# Patient Record
Sex: MALE | Race: OTHER | NOT HISPANIC OR LATINO
[De-identification: names, ages, dates, MRNs, and addresses within clinical notes are randomized per-mention and may not be internally consistent; named-entity substitution may affect disease eponyms.]

---

## 2017-08-30 ENCOUNTER — TRANSCRIPTION ENCOUNTER (OUTPATIENT)
Age: 47
End: 2017-08-30

## 2018-07-21 ENCOUNTER — EMERGENCY (EMERGENCY)
Facility: HOSPITAL | Age: 48
LOS: 1 days | Discharge: HOME | End: 2018-07-21
Attending: EMERGENCY MEDICINE
Payer: SELF-PAY

## 2018-07-21 VITALS
TEMPERATURE: 98 F | RESPIRATION RATE: 18 BRPM | HEART RATE: 74 BPM | SYSTOLIC BLOOD PRESSURE: 107 MMHG | DIASTOLIC BLOOD PRESSURE: 66 MMHG | OXYGEN SATURATION: 100 %

## 2018-07-21 VITALS
DIASTOLIC BLOOD PRESSURE: 76 MMHG | HEIGHT: 74 IN | WEIGHT: 201.94 LBS | HEART RATE: 92 BPM | RESPIRATION RATE: 18 BRPM | SYSTOLIC BLOOD PRESSURE: 116 MMHG | TEMPERATURE: 98 F | OXYGEN SATURATION: 98 %

## 2018-07-21 PROCEDURE — 82962 GLUCOSE BLOOD TEST: CPT

## 2018-07-21 PROCEDURE — 99283 EMERGENCY DEPT VISIT LOW MDM: CPT

## 2018-07-21 PROCEDURE — 99285 EMERGENCY DEPT VISIT HI MDM: CPT

## 2018-07-21 NOTE — ED PROVIDER NOTE - OBJECTIVE STATEMENT
seen on arrival, comes via ems, found intoxicated in public, cant walk  pt reports drinking a lot, is visually imparied and had a hard time getting home  he called his boyfriend to pick him up  No sick contacts, no injury or trauma, no travel.

## 2018-07-21 NOTE — ED PROVIDER NOTE - MEDICAL DECISION MAKING DETAILS
nos igns fo trauma, reports drinking, clinically intox  dispo when sober or if partner comes to pick patient up

## 2018-07-21 NOTE — ED ADULT NURSE NOTE - ED STAT RN HANDOFF DETAILS
Received patient from Manolo SIEGEL in no acute distress, with unsteady gait ambulated to bathroom by Rn. Patient resting not admitted continue to monitor patient.

## 2018-07-21 NOTE — ED ADULT NURSE NOTE - ED STAT RN HANDOFF DETAILS 2
Patient discharged home as per MD order spouse here to pick patient. Patient OOB ambulatory in no acute distress.

## 2023-01-18 ENCOUNTER — APPOINTMENT (OUTPATIENT)
Dept: ORTHOPEDIC SURGERY | Facility: CLINIC | Age: 53
End: 2023-01-18
Payer: COMMERCIAL

## 2023-01-18 DIAGNOSIS — Z00.00 ENCOUNTER FOR GENERAL ADULT MEDICAL EXAMINATION W/OUT ABNORMAL FINDINGS: ICD-10-CM

## 2023-01-18 PROCEDURE — 73564 X-RAY EXAM KNEE 4 OR MORE: CPT | Mod: RT

## 2023-01-18 PROCEDURE — 73030 X-RAY EXAM OF SHOULDER: CPT | Mod: RT

## 2023-01-18 PROCEDURE — 99204 OFFICE O/P NEW MOD 45 MIN: CPT

## 2023-01-18 RX ORDER — MELOXICAM 15 MG/1
15 TABLET ORAL
Qty: 30 | Refills: 0 | Status: ACTIVE | COMMUNITY
Start: 2023-01-18 | End: 1900-01-01

## 2023-01-18 NOTE — DISCUSSION/SUMMARY
[de-identified] : Assessment: The patient is a 52 year old male with R shoulder and R knee pain and physical exam findings consistent with OA R knee and R shoulder.\par \par Patient and I discussed their symptoms. Discussed findings of today's exam and possible causes of patient's pain. Educated patient on their most probable diagnosis. Reviewed possible courses of treatment, and we collaboratively decided best course of treatment at this time will include:\par \par 1. MRI R knee\par 2. Start Meloxicam \par 3. Will consider CSI if no improvement\par \par The patient's current medication management of their orthopedic diagnosis was reviewed today: \par \par (1) We discussed a comprehensive treatment plan that included possible pharmaceutical management involving the use of prescription strength medications including but not limited to options such as oral Naprosyn 500mg BID, once daily Meloxicam 15 mg, or 500-650 mg Tylenol versus over the counter oral medications and topical prescription NSAID Pennsaid vs over the counter Voltaren gel. \par \par (2) There is a moderate risk of morbidity with further treatment, especially from use of prescription strength medications and possible side effects of these medications which include upset stomach with oral medications, skin reactions to topical medications and cardiac/renal issues with long term use. \par \par (3) I recommended that the patient follow-up with their medical physician to discuss any significant specific potential issues with long term medication use such as interactions with current medications or with exacerbation of underlying medical comorbidities. \par \par (4) The benefits and risks associated with use of injectable, oral or topical, prescription and over the counter anti-inflammatory medications were discussed with the patient. The patient voiced understanding of the risks including but not limited to bleeding, stroke, kidney dysfunction, heart disease, and were referred to the black box warning label for further information.\par \par Prior to appointment and during encounter with patient extensive medical records were reviewed including but not limited to, hospital records, out patient records, imaging results, and lab data. During this appointment the patient was examined, diagnoses were discussed and explained in a face to face manner. In addition extensive time was spent reviewing aforementioned diagnostic studies. Counseling including abnormal image results, differential diagnoses, treatment options, risk and benefits, lifestyle changes, current condition, and current medications was performed. Patient's comments, questions, and concerns were address and patient verbalized understanding.\par \par Follow up after MRI. \par \par History, physical exam, imaging, assessment and plan documented by Ari Miles. The documentation recorded by the scribe accurately reflects the service I, Zia Morales MD, personally performed and the decisions made by me.

## 2023-01-18 NOTE — HISTORY OF PRESENT ILLNESS
[8] : 8 [Dull/Aching] : dull/aching [Radiating] : radiating [Sharp] : sharp [Shooting] : shooting [Throbbing] : throbbing [Constant] : constant [Meds] : meds [Sitting] : sitting [Standing] : standing [Bending forward] : bending forward [Extending back] : extending back [Stairs] : stairs [de-identified] : 01/18/2023: 52 year male is here today as a new patient for chronic R knee and R shoulder pain\par Pt denies acute trauma, no n/t, no n/t radiating to hand on affected side. \par Experiences mechanical sxs in his R knee daily\par Denies clicking, catching, locking of his shoulder \par Pt not taking NSAIDs, no injections, not doing PT. \par No h/o surgeries\par h/o meniscus tear L knee years ago, pain feels similar to current R knee pain [] : no [FreeTextEntry1] : Right Shoulder/Right Knee [FreeTextEntry5] : JESSIE is here for Right Shoulder/Right Knee. 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 Pt states is not able to lift nor extend with the Shoulder. Pt states when putting pressure and as well when lying down.

## 2023-01-18 NOTE — IMAGING
[Type 2 acromion] : Type 2 acromion [Right] : right knee [There are no fractures, subluxations or dislocations. No significant abnormalities are seen] : There are no fractures, subluxations or dislocations. No significant abnormalities are seen [Degenerative change] : Degenerative change [Mild tricompartmental OA medial narrowing] : Mild tricompartmental OA medial narrowing [de-identified] : The patient is a well appearing 52 year year old male of their stated age.\par Patient ambulates with a normal gait.\par Negative straight leg raise bilateral\par \par Effected Knee:                         	\par ROM:  0-145 degrees\par \par Lachman: Negative\par Pivot Shift: Negative\par Anterior Drawer: Negative\par Posterior Drawer / Sag: Negative\par Varus Stress 0 degrees: Stable\par Varus Stress 30 degrees: Stable\par Valgus Stress 0 degrees: Stable\par Valgus Stress 30 degrees: Stable\par Medial Marlo: Positive\par Lateral Marlo: Negative\par Patella Glide: 2+\par Patella Apprehension: Negative\par Patella Grind: Negative\par \par Palpation:\par Medial Joint Line: TTP\par Lateral Joint Line: Nontender\par Medial Collateral Ligament: Nontender\par Lateral Collateral Ligament/PLC: Nontender\par Distal Femur: Nontender\par Proximal Tibia: Nontender\par Tibial Tubercle: Nontender\par Distal Pole Patella: Nontender\par Quadriceps Tendon: Nontender &  Intact\par Patella Tendon: Nontender &  Intact\par Medial Distal Hamstring/PES: Nontender\par Lateral Distal Hamstring: Nontender & Stable\par Iliotibial Band: Nontender\par Medial Patellofemoral Ligament: Nontender\par Adductor: Nontender\par Proximal GSC-Plantaris: Nontender\par Calf: Supple & Nontender\par \par Inspection:\par Deformity: No\par Erythema: No\par Ecchymosis: No\par Abrasions: No\par Effusion: Moderate\par Prepatellar Bursitis: No\par \par Neurologic Exam:\par Sensation L4-S1: Grossly Intact\par \par Motor Exam:\par Quadriceps: 5 out of 5\par Hamstrings: 5 out of 5\par EHL: 5 out of 5\par FHL: 5 out of 5\par TA: 5 out of 5\par GS: 5 out of 5\par \par Circulatory/Pulses:\par Dorsalis Pedis: 2+\par Posterior Tibialis: 2+\par \par Additional Pertinent Findings: None\par \par Contralateral Knee:                           	\par ROM: 0-145 degrees\par \par Other Pertinent Findings: None\par   \par ----------------\par Neck is supple & nontender to palpation. Negative Spurling's test.\par \par Effected Shoulder \par Inspection:\par Scapula Winging: Negative\par Deformity: None\par Erythema: None\par Ecchymosis: None\par Abrasions: None\par Effusion: Mild\par Crepitus: Moderate\par \par Range of Motion:\par Active Forward Flexion:110 degrees \par Passive Forward Flexion:130 degrees \par Active IR : back pocket\par Active ER :30 degrees\par \par Motor Exam:\par Forward Flexion: 4+ out of 5\par Flexion Plane of Scapula: 5 out of 5\par Abduction: 4+ out of 5\par Internal Rotation: 5 out of 5\par External Rotation: 4+ out of 5\par Distal Motor Strength: 5 out of 5\par \par Stability Testing:\par Anterior: 1+\par Posterior: 1+\par Sulcus N: 1+\par Sulcus ER: 1+\par \par Provocative Tests:\par Drop Arm: Negative\par Reeder/Impingement: Positive\par [place]: Positive\par X-Arm Adduction: Negative\par Belly Press: Negative\par Bear Hug: Negative\par Lift Off: Negative\par Apprehension: Negative\par Relocation: Negative\par Posterior Load & Shift: Negative\par \par Palpation:\par AC Joint: Nontender\par Clavicle: Nontender\par SC Joint: Nontender\par Bicepital Groove: Positive\par Coracoid Process: Positive\par Pectoralis Minor Tendon: Nontender\par Pectoralis Major Tendon: Nontender & palpably intact\par Latissimus Dorsi: Nontender \par Proximal Humerus: Positive\par Scapula Body: Nontender\par Medial Scapula Boarder: Nontender\par Scapula Spine: Nontender\par \par Neurologic Exam: Sensation to Light Touch:\par Axillary: Grossly intact\par Ulnar: Grossly intact\par Radial: Grossly intact\par Median: Grossly intact\par Other:  N/A\par \par Circulatory/Pulses:\par Ulnar: 2+\par Radial: 2+\par Other Pertinent Findings: None\par \par Contralateral Shoulder\par Range of Motion:\par Active Forward Flexion: 180 degrees \par Active Abduction: 180 degrees \par Passive Forward Flexion: 180 degrees \par Passive Abduction: 180 degrees \par ER @ 90 degrees: 90 degrees\par IR @ 90 degrees: 45 degrees\par ER @ 0 degrees: 50 degrees\par \par Motor Exam:\par Forward Flexion: 5 out of 5\par Flexion Plane of Scapula: 5 out of 5\par Abduction: 5 out of 5\par Internal Rotation: 5 out of 5\par External Rotation: 5 out of 5\par Distal Motor Strength: 5 out of 5\par \par Stability Testing:\par Anterior: 1+\par Posterior: 1+\par Sulcus N: 1+\par Sulcus ER: 1+\par \par Other Pertinent Findings: None\par

## 2023-01-26 ENCOUNTER — FORM ENCOUNTER (OUTPATIENT)
Age: 53
End: 2023-01-26

## 2023-01-27 ENCOUNTER — APPOINTMENT (OUTPATIENT)
Dept: MRI IMAGING | Facility: CLINIC | Age: 53
End: 2023-01-27
Payer: COMMERCIAL

## 2023-01-27 PROCEDURE — 73721 MRI JNT OF LWR EXTRE W/O DYE: CPT | Mod: RT

## 2023-02-08 ENCOUNTER — APPOINTMENT (OUTPATIENT)
Dept: ORTHOPEDIC SURGERY | Facility: CLINIC | Age: 53
End: 2023-02-08
Payer: COMMERCIAL

## 2023-02-08 DIAGNOSIS — M19.011 PRIMARY OSTEOARTHRITIS, RIGHT SHOULDER: ICD-10-CM

## 2023-02-08 PROCEDURE — 99214 OFFICE O/P EST MOD 30 MIN: CPT

## 2023-02-14 NOTE — HISTORY OF PRESENT ILLNESS
[8] : 8 [Dull/Aching] : dull/aching [Intermittent] : intermittent [de-identified] : 2/8/23 - Patient presents today to discuss MRI results of the R knee.  [] : no [FreeTextEntry5] : JESSIE is here for Right Knee.Pt states having some slight pain specially when walking and when doing slight movement is when the pain increases.

## 2023-02-14 NOTE — DATA REVIEWED
[MRI] : MRI [Right] : of the right [Report was reviewed and noted in the chart] : The report was reviewed and noted in the chart [I independently reviewed and interpreted images and report] : I independently reviewed and interpreted images and report [FreeTextEntry1] : 1. Medial meniscal tear.\par 2. Arthrosis with joint effusion.\par 3. Lateral subluxation of the patella. Quadriceps insertional tendinopathy and fraying. Proximal patella \par tendinopathy.\par 4. Impaction injury of the fibular head with 5 mm cyst and reactive marrow edema.\par 5. Incompletely evaluated enchondroma of fibular head with no fracture along the visualized margins of the \par enchondroma.\par 6. 5 mm loose body versus focal fat mimicking loose body posterior to the tibiofibular joint.

## 2023-02-14 NOTE — DISCUSSION/SUMMARY
[de-identified] : Discussed his MMT and OA. Advised he see Dr. Galeana for consultation for possible arthroplasty. If not indicated, can consider arthroscopy in form of PMM.\par \par The natural progression of osteoarthritis was explained to the patient.  We discussed the possible treatment options from conservative to operative.  These included NSAIDS, Glucosamine and Chondrotin sulfate, and Physical Therapy as well different types of injections.  We also discussed that at some point they may progress to needed a TKA.  Information and pamphlets were given.\par \par We discussed their diagnosis and treatment options at length including surgical and non-surgical options. We will first attempt conservative treatment with activity modification, PT, icing, weight loss, and anti-inflammatory medications. We discussed the possible of injections (steroid and viscosupplementation) in the future. The patient was provided with a PT prescription to work on ROM, hip ER/abductors strengthening, quad/hamstring stretches and strengthening, and other exercises on the Knee Arthritis Protocol.\par \par Dx / Natural History:\par The patient was advised of the diagnosis.  The natural history of the pathology was explained in full to the patient in layman's terms.  Several different treatment options were discussed and explained in full to the patient including the risks and benefits of both surgical and non-surgical treatments.  All questions and concerns were answered. \par \par Pain Guide Activities:\par The patient was advised to let pain guide the gradual advancement of activities.\par \par Physical Therapy:\par The patient was provided with a prescription for Physical Therapy.\par \par Icing:\par The patient was advised to apply ice (wrapped in a towel or protective covering) to the area daily (20 minutes at a time, 2-4X/day).

## 2023-02-14 NOTE — IMAGING
[de-identified] : The patient is a well appearing 52 year year old male of their stated age.\par Patient ambulates with a normal gait.\par Negative straight leg raise bilateral\par \par Effected Knee:                         	\par ROM:  0-145 degrees\par \par Lachman: Negative\par Pivot Shift: Negative\par Anterior Drawer: Negative\par Posterior Drawer / Sag: Negative\par Varus Stress 0 degrees: Stable\par Varus Stress 30 degrees: Stable\par Valgus Stress 0 degrees: Stable\par Valgus Stress 30 degrees: Stable\par Medial Marlo: Positive\par Lateral Marlo: Negative\par Patella Glide: 2+\par Patella Apprehension: Negative\par Patella Grind: Negative\par \par Palpation:\par Medial Joint Line: TTP\par Lateral Joint Line: Nontender\par Medial Collateral Ligament: Nontender\par Lateral Collateral Ligament/PLC: Nontender\par Distal Femur: Nontender\par Proximal Tibia: Nontender\par Tibial Tubercle: Nontender\par Distal Pole Patella: Nontender\par Quadriceps Tendon: Nontender &  Intact\par Patella Tendon: Nontender &  Intact\par Medial Distal Hamstring/PES: Nontender\par Lateral Distal Hamstring: Nontender & Stable\par Iliotibial Band: Nontender\par Medial Patellofemoral Ligament: Nontender\par Adductor: Nontender\par Proximal GSC-Plantaris: Nontender\par Calf: Supple & Nontender\par \par Inspection:\par Deformity: No\par Erythema: No\par Ecchymosis: No\par Abrasions: No\par Effusion: Moderate\par Prepatellar Bursitis: No\par \par Neurologic Exam:\par Sensation L4-S1: Grossly Intact\par \par Motor Exam:\par Quadriceps: 5 out of 5\par Hamstrings: 5 out of 5\par EHL: 5 out of 5\par FHL: 5 out of 5\par TA: 5 out of 5\par GS: 5 out of 5\par \par Circulatory/Pulses:\par Dorsalis Pedis: 2+\par Posterior Tibialis: 2+\par \par Additional Pertinent Findings: None\par \par Contralateral Knee:                           	\par ROM: 0-145 degrees\par \par Other Pertinent Findings: None\par   \par ----------------\par Neck is supple & nontender to palpation. Negative Spurling's test.\par \par Effected Shoulder \par Inspection:\par Scapula Winging: Negative\par Deformity: None\par Erythema: None\par Ecchymosis: None\par Abrasions: None\par Effusion: Mild\par Crepitus: Moderate\par \par Range of Motion:\par Active Forward Flexion:110 degrees \par Passive Forward Flexion:130 degrees \par Active IR : back pocket\par Active ER :30 degrees\par \par Motor Exam:\par Forward Flexion: 4+ out of 5\par Flexion Plane of Scapula: 5 out of 5\par Abduction: 4+ out of 5\par Internal Rotation: 5 out of 5\par External Rotation: 4+ out of 5\par Distal Motor Strength: 5 out of 5\par \par Stability Testing:\par Anterior: 1+\par Posterior: 1+\par Sulcus N: 1+\par Sulcus ER: 1+\par \par Provocative Tests:\par Drop Arm: Negative\par Reeder/Impingement: Positive\par [place]: Positive\par X-Arm Adduction: Negative\par Belly Press: Negative\par Bear Hug: Negative\par Lift Off: Negative\par Apprehension: Negative\par Relocation: Negative\par Posterior Load & Shift: Negative\par \par Palpation:\par AC Joint: Nontender\par Clavicle: Nontender\par SC Joint: Nontender\par Bicepital Groove: Positive\par Coracoid Process: Positive\par Pectoralis Minor Tendon: Nontender\par Pectoralis Major Tendon: Nontender & palpably intact\par Latissimus Dorsi: Nontender \par Proximal Humerus: Positive\par Scapula Body: Nontender\par Medial Scapula Boarder: Nontender\par Scapula Spine: Nontender\par \par Neurologic Exam: Sensation to Light Touch:\par Axillary: Grossly intact\par Ulnar: Grossly intact\par Radial: Grossly intact\par Median: Grossly intact\par Other:  N/A\par \par Circulatory/Pulses:\par Ulnar: 2+\par Radial: 2+\par Other Pertinent Findings: None\par \par Contralateral Shoulder\par Range of Motion:\par Active Forward Flexion: 180 degrees \par Active Abduction: 180 degrees \par Passive Forward Flexion: 180 degrees \par Passive Abduction: 180 degrees \par ER @ 90 degrees: 90 degrees\par IR @ 90 degrees: 45 degrees\par ER @ 0 degrees: 50 degrees\par \par Motor Exam:\par Forward Flexion: 5 out of 5\par Flexion Plane of Scapula: 5 out of 5\par Abduction: 5 out of 5\par Internal Rotation: 5 out of 5\par External Rotation: 5 out of 5\par Distal Motor Strength: 5 out of 5\par \par Stability Testing:\par Anterior: 1+\par Posterior: 1+\par Sulcus N: 1+\par Sulcus ER: 1+\par \par Other Pertinent Findings: None\par

## 2023-03-08 ENCOUNTER — APPOINTMENT (OUTPATIENT)
Dept: ORTHOPEDIC SURGERY | Facility: CLINIC | Age: 53
End: 2023-03-08
Payer: COMMERCIAL

## 2023-03-08 VITALS — HEIGHT: 72 IN | WEIGHT: 210 LBS | BODY MASS INDEX: 28.44 KG/M2

## 2023-03-08 PROCEDURE — 99214 OFFICE O/P EST MOD 30 MIN: CPT

## 2023-03-08 NOTE — ASSESSMENT
[FreeTextEntry1] : 52 year M with right knee mensical tear and OA\par - physical therapy, NSAIDs, and meloxicam\par - Return in 1-2 weeks for CSI of the right knee

## 2023-03-08 NOTE — IMAGING
[de-identified] : Knee Exam\par Alignment: Varus  Neutral  Valgus\par Effusion: None\par Atrophy: None                                                \par Stable to Varus/valgus stress\par Posterior Drawer Test: negative\par Anterior Drawer Test: Negative\par Knee Extension/Flexion: 0 / 120\par \par Medial/lateral compartments\par Medial joint line: POS Tenderness\par Lateral joint line: No Tenderness\par Marlo test: POS\par \par Patellofemoral joint\par Medial patellar facet: no tenderness\par Patellar grind: Negative\par \par Tendons:\par Pes Anserine: No tenderness\par Gerdy’s Tubercle/ IT Band: No tenderness\par Quadriceps Tendon: No Tenderness\par patellar tendon: no Tenderness\par Tibial tubercle: not tenderness\par Calf: no Tenderness\par \par Neurovascular exam\par Muscle strength: 5/5\par Sensation to light touch: intact\par Distal pulses: 2+\par \par MRI \par 1. Medial meniscal tear.\par 2. Arthrosis with joint effusion.\par 3. Lateral subluxation of the patella. Quadriceps insertional tendinopathy and fraying. Proximal patella \par tendinopathy.\par 4. Impaction injury of the fibular head with 5 mm cyst and reactive marrow edema.\par 5. Incompletely evaluated enchondroma of fibular head with no fracture along the visualized margins of the \par enchondroma.\par 6. 5 mm loose body versus focal fat mimicking loose body posterior to the tibiofibular joint. Plain film \par correlation is suggested.

## 2023-03-08 NOTE — HISTORY OF PRESENT ILLNESS
[de-identified] : 03/08/2023 Mr. JESSIE BINGHAM is a 52 years old  M who   presents today for evaluation of 01/18/2023: 52 year male is here today as a new patient for chronic R knee\par Pt denies acute trauma, no n/t, no n/t radiating to hand on affected side. \par Experiences mechanical sxs in his R knee daily\par Pt not taking NSAIDs, no injections, not doing PT. \par No h/o surgeries\par h/o meniscus tear L knee years ago, pain feels similar to current R knee pain \par \par + meloxicam.\par

## 2023-03-15 ENCOUNTER — APPOINTMENT (OUTPATIENT)
Dept: ORTHOPEDIC SURGERY | Facility: CLINIC | Age: 53
End: 2023-03-15
Payer: COMMERCIAL

## 2023-03-15 PROCEDURE — 20610 DRAIN/INJ JOINT/BURSA W/O US: CPT | Mod: RT

## 2023-03-15 PROCEDURE — 99213 OFFICE O/P EST LOW 20 MIN: CPT | Mod: 25

## 2023-03-15 NOTE — PROCEDURE
[FreeTextEntry3] : The risks, benefits and alternatives to the injection were discussed with the patient. The decision was made to proceed with the injection to reduce inflammation within the area. Verbal consent was obtained for the procedure. The right knee was cleaned with alcohol and ethyl chloride was sprayed topically. 1cc of 40mg Kenalog and 4cc of 1% lidocaine was injected. The patient tolerated the procedure well and was instructed to ice the affected joint as needed later today. Activities can be performed as tolerated\par

## 2023-03-15 NOTE — HISTORY OF PRESENT ILLNESS
[de-identified] : 03/08/2023 Mr. JESSIE BINGHAM is a 52 years old  M who   presents today for evaluation of 01/18/2023: 52 year male is here today as a new patient for chronic R knee\par Pt denies acute trauma, no n/t, no n/t radiating to hand on affected side. \par Experiences mechanical sxs in his R knee daily\par Pt not taking NSAIDs, no injections, not doing PT. \par No h/o surgeries\par h/o meniscus tear L knee years ago, pain feels similar to current R knee pain \par \par + meloxicam.\par \par 03/15/2023 JESSIE 52 year old M is here for Right Knee, states is here to get CSI, is doing the physical therapy for the Right Knee and it is helping, states sometimes walking is having sharp pain and when lying down is having some aching feeling

## 2023-03-15 NOTE — ASSESSMENT
[FreeTextEntry1] : 52 year M with right knee mensical tear and OA\par - physical therapy, NSAIDs, and meloxicam\par - Return in 1-2 weeks for CSI of the right knee\par \par 03/15/2023 CSI of the right knee today\par 3 months PRN fu

## 2023-03-15 NOTE — IMAGING
[de-identified] : Knee Exam\par Alignment: Varus  Neutral  Valgus\par Effusion: None\par Atrophy: None                                                \par Stable to Varus/valgus stress\par Posterior Drawer Test: negative\par Anterior Drawer Test: Negative\par Knee Extension/Flexion: 0 / 120\par \par Medial/lateral compartments\par Medial joint line: POS Tenderness\par Lateral joint line: No Tenderness\par Marlo test: POS\par \par Patellofemoral joint\par Medial patellar facet: no tenderness\par Patellar grind: Negative\par \par Tendons:\par Pes Anserine: No tenderness\par Gerdy’s Tubercle/ IT Band: No tenderness\par Quadriceps Tendon: No Tenderness\par patellar tendon: no Tenderness\par Tibial tubercle: not tenderness\par Calf: no Tenderness\par \par Neurovascular exam\par Muscle strength: 5/5\par Sensation to light touch: intact\par Distal pulses: 2+\par \par MRI \par 1. Medial meniscal tear.\par 2. Arthrosis with joint effusion.\par 3. Lateral subluxation of the patella. Quadriceps insertional tendinopathy and fraying. Proximal patella \par tendinopathy.\par 4. Impaction injury of the fibular head with 5 mm cyst and reactive marrow edema.\par 5. Incompletely evaluated enchondroma of fibular head with no fracture along the visualized margins of the \par enchondroma.\par 6. 5 mm loose body versus focal fat mimicking loose body posterior to the tibiofibular joint. Plain film \par correlation is suggested.

## 2023-06-01 ENCOUNTER — APPOINTMENT (OUTPATIENT)
Dept: ORTHOPEDIC SURGERY | Facility: CLINIC | Age: 53
End: 2023-06-01
Payer: COMMERCIAL

## 2023-06-01 PROCEDURE — 72110 X-RAY EXAM L-2 SPINE 4/>VWS: CPT

## 2023-06-01 PROCEDURE — 99214 OFFICE O/P EST MOD 30 MIN: CPT

## 2023-06-01 PROCEDURE — 72170 X-RAY EXAM OF PELVIS: CPT

## 2023-06-01 NOTE — IMAGING
[de-identified] : LSPINE\par Inspection: No rash or ecchymosis\par Palpation: No tenderness to palpation or spasm in bilateral thoracic and lumbar paraspinal musculature, R>l SI joint tenderness to palpation\par ROM: Full with stiffness\par Strength: 5/5 bilateral hip flexors, knee extensors, ankle dorsiflexors, EHL, ankle plantarflexors\par Sensation: Sensation present to light touch bilateral L2-S1 distributions\par Provocative maneuvers: + bilateral straight leg raise \par \par Bilateral hips-\par Palpation: No tenderness to palpation over LEFT greater trochanter or IT band; TTP R GT\par ROM: No pain with flexion and internal rotation  [Facet arthropathy] : Facet arthropathy [AP] : anteroposterior [Disc space narrowing] : Disc space narrowing [There are no fractures, subluxations or dislocations. No significant abnormalities are seen] : There are no fractures, subluxations or dislocations. No significant abnormalities are seen

## 2023-06-01 NOTE — HISTORY OF PRESENT ILLNESS
[5] : 5 [8] : 8 [Burning] : burning [Sharp] : sharp [Stabbing] : stabbing [Intermittent] : intermittent [Nothing helps with pain getting better] : Nothing helps with pain getting better [Lying in bed] : lying in bed [de-identified] : 6/1/23-\par JESSIE 52 year old M here for Lower back and RT hip, onset pain since 4/2023, denies and injury , pt was driving from [place] and felt a burning sensation radiating down the RLE posterior thigh. Intermittent pain down the BLE to the legs posteriorly and to the feet.  [] : no [de-identified] : certain movement

## 2023-06-01 NOTE — ASSESSMENT
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  Advised to clear medication with internist or PCP if any concurrent health problem with heart, blood pressure, or GI system exists.\par \par Gabapentin- Patient advised of sedating effects, instructed not to drive, operate machinery, or take with other sedating medications. Advised of need to taper on/off medication and risk of abruptly stopping gabapentin.

## 2023-06-21 ENCOUNTER — APPOINTMENT (OUTPATIENT)
Dept: ORTHOPEDIC SURGERY | Facility: CLINIC | Age: 53
End: 2023-06-21

## 2023-06-21 ENCOUNTER — APPOINTMENT (OUTPATIENT)
Dept: ORTHOPEDIC SURGERY | Facility: CLINIC | Age: 53
End: 2023-06-21
Payer: COMMERCIAL

## 2023-06-21 PROCEDURE — 99214 OFFICE O/P EST MOD 30 MIN: CPT | Mod: 25

## 2023-06-21 PROCEDURE — 20610 DRAIN/INJ JOINT/BURSA W/O US: CPT | Mod: RT

## 2023-06-21 NOTE — ASSESSMENT
[FreeTextEntry1] : 52 year M with right knee mensical tear and OA\par - physical therapy, NSAIDs, and meloxicam\par - Return in 1-2 weeks for CSI of the right knee\par \par 03/15/2023 CSI of the right knee today\par 3 months PRN fu\par \par 6/21/23: CSI of the right knee. PT renewed for Lspine and knee\par Follow up 3 months PRN

## 2023-06-21 NOTE — IMAGING
[de-identified] : Knee Exam\par Alignment: Varus  Neutral  Valgus\par Effusion: None\par Atrophy: None                                                \par Stable to Varus/valgus stress\par Posterior Drawer Test: negative\par Anterior Drawer Test: Negative\par Knee Extension/Flexion: 0 / 120\par \par Medial/lateral compartments\par Medial joint line: POS Tenderness\par Lateral joint line: No Tenderness\par Marlo test: POS\par \par Patellofemoral joint\par Medial patellar facet: no tenderness\par Patellar grind: Negative\par \par Tendons:\par Pes Anserine: No tenderness\par Gerdy’s Tubercle/ IT Band: No tenderness\par Quadriceps Tendon: No Tenderness\par patellar tendon: no Tenderness\par Tibial tubercle: not tenderness\par Calf: no Tenderness\par \par Neurovascular exam\par Muscle strength: 5/5\par Sensation to light touch: intact\par Distal pulses: 2+\par \par MRI \par 1. Medial meniscal tear.\par 2. Arthrosis with joint effusion.\par 3. Lateral subluxation of the patella. Quadriceps insertional tendinopathy and fraying. Proximal patella \par tendinopathy.\par 4. Impaction injury of the fibular head with 5 mm cyst and reactive marrow edema.\par 5. Incompletely evaluated enchondroma of fibular head with no fracture along the visualized margins of the \par enchondroma.\par 6. 5 mm loose body versus focal fat mimicking loose body posterior to the tibiofibular joint. Plain film \par correlation is suggested.

## 2023-06-21 NOTE — HISTORY OF PRESENT ILLNESS
[de-identified] : 03/08/2023 Mr. JESSIE BINGHAM is a 52 years old  M who   presents today for evaluation of 01/18/2023: 52 year male is here today as a new patient for chronic R knee\par Pt denies acute trauma, no n/t, no n/t radiating to hand on affected side. \par Experiences mechanical sxs in his R knee daily\par Pt not taking NSAIDs, no injections, not doing PT. \par No h/o surgeries\par h/o meniscus tear L knee years ago, pain feels similar to current R knee pain \par \par + meloxicam.\par \par 03/15/2023 JESSIE 52 year old M is here for Right Knee, states is here to get CSI, is doing the physical therapy for the Right Knee and it is helping, states sometimes walking is having sharp pain and when lying down is having some aching feeling \par \par 06/21/2023: JESSIE is here today to follow up on his RIGHT knee. he states his gait is off which is increasing his knee pain. c/o increased pain when walking and laying in down. Had good relief with CSI. Last CSI was three months prior.

## 2023-07-27 ENCOUNTER — APPOINTMENT (OUTPATIENT)
Dept: ORTHOPEDIC SURGERY | Facility: CLINIC | Age: 53
End: 2023-07-27
Payer: COMMERCIAL

## 2023-07-27 PROCEDURE — 99214 OFFICE O/P EST MOD 30 MIN: CPT

## 2023-07-27 RX ORDER — CYCLOBENZAPRINE HYDROCHLORIDE 5 MG/1
5 TABLET, FILM COATED ORAL
Qty: 30 | Refills: 0 | Status: ACTIVE | COMMUNITY
Start: 2023-07-27 | End: 1900-01-01

## 2023-07-27 RX ORDER — GABAPENTIN 100 MG/1
100 CAPSULE ORAL
Qty: 60 | Refills: 2 | Status: DISCONTINUED | COMMUNITY
Start: 2023-06-01 | End: 2023-07-27

## 2023-07-27 NOTE — HISTORY OF PRESENT ILLNESS
[5] : 5 [8] : 8 [Burning] : burning [Sharp] : sharp [Stabbing] : stabbing [Intermittent] : intermittent [Nothing helps with pain getting better] : Nothing helps with pain getting better [Lying in bed] : lying in bed [de-identified] : 6/1/23-\par JESSIE 52 year old M here for Lower back and RT hip, onset pain since 4/2023, denies and injury , pt was driving from [place] and felt a burning sensation radiating down the RLE posterior thigh. Intermittent pain down the BLE to the legs posteriorly and to the feet. \par \par 7/27/23-Continued LBP with radiation down RLE posteriorly to toes 4-5. Aggravated by turning over in bed and walking. In PT. Denies b/b incontinence. Reports anxiety/nightmares with doc; has D/Leroy.  [] : no [FreeTextEntry5] : PT here for follow up Lower back ,pain is radiating down his RT Leg, pt has pain when laying down and turn to his Rt side \par pt stopped taking gabapentin , reports he was having panic sensations \par Naproxen has been helping with pt soreness  [de-identified] : certain movement

## 2023-07-27 NOTE — IMAGING
[Facet arthropathy] : Facet arthropathy [Disc space narrowing] : Disc space narrowing [AP] : anteroposterior [There are no fractures, subluxations or dislocations. No significant abnormalities are seen] : There are no fractures, subluxations or dislocations. No significant abnormalities are seen [de-identified] : LSPINE\par Inspection: No rash or ecchymosis\par Palpation: No tenderness to palpation or spasm in bilateral thoracic and lumbar paraspinal musculature, R>l SI joint tenderness to palpation\par ROM: Full with stiffness\par Strength: 5/5 bilateral hip flexors, knee extensors, ankle dorsiflexors, EHL, ankle plantarflexors\par Sensation: Sensation present to light touch bilateral L2-S1 distributions\par Provocative maneuvers: + bilateral straight leg raise \par \par Bilateral hips-\par Palpation: No tenderness to palpation over LEFT greater trochanter or IT band; TTP R GT\par ROM: No pain with flexion and internal rotation

## 2023-07-27 NOTE — ASSESSMENT
[FreeTextEntry1] : Severe DDD L4-S1 with element of congenital stenosis and BLE radic\par \par Patient has failed 6 weeks of conservative care, including physical therapy and medications. Will obtain lumbar MRI to rule out HNP; will also be used to guide potential future injections/surgical management.\par f/u after MRI\par \par Will discontinue doc due to adverse effects and add cyclobenzaprine.\par \par NSAIDs- Patient warned of risk of medication to GI tract, increased blood pressure, cardiac risk, and risk of fluid retention.  Advised to clear medication with internist or PCP if any concurrent health problem with heart, blood pressure, or GI system exists.\par \par Muscle Relaxants- To help decrease muscle spasm and assist with pain relief. Advised of sedating effects and instructed not to drive, operate heavy machinery, or take with other sedating medications.\par \par \par Patient seen by Dayana Garcia PA-C,  under the supervision of  Dr. Itz Hoskins M.D.\par

## 2023-08-04 ENCOUNTER — APPOINTMENT (OUTPATIENT)
Dept: MRI IMAGING | Facility: CLINIC | Age: 53
End: 2023-08-04

## 2023-09-06 ENCOUNTER — APPOINTMENT (OUTPATIENT)
Dept: ORTHOPEDIC SURGERY | Facility: CLINIC | Age: 53
End: 2023-09-06

## 2023-12-28 ENCOUNTER — APPOINTMENT (OUTPATIENT)
Dept: ORTHOPEDIC SURGERY | Facility: CLINIC | Age: 53
End: 2023-12-28
Payer: MEDICAID

## 2023-12-28 DIAGNOSIS — M51.36 OTHER INTERVERTEBRAL DISC DEGENERATION, LUMBAR REGION: ICD-10-CM

## 2023-12-28 DIAGNOSIS — M46.1 SACROILIITIS, NOT ELSEWHERE CLASSIFIED: ICD-10-CM

## 2023-12-28 DIAGNOSIS — M54.16 RADICULOPATHY, LUMBAR REGION: ICD-10-CM

## 2023-12-28 PROCEDURE — 99214 OFFICE O/P EST MOD 30 MIN: CPT

## 2023-12-28 RX ORDER — NAPROXEN 500 MG/1
500 TABLET ORAL
Qty: 60 | Refills: 0 | Status: ACTIVE | COMMUNITY
Start: 2023-06-01 | End: 1900-01-01

## 2023-12-28 NOTE — IMAGING
[de-identified] : LSPINE Inspection: No rash or ecchymosis Palpation: No tenderness to palpation or spasm in bilateral thoracic and lumbar paraspinal musculature, R>l SI joint tenderness to palpation ROM: Full with stiffness Strength: 5/5 bilateral hip flexors, knee extensors, ankle dorsiflexors, EHL, ankle plantarflexors Sensation: Sensation present to light touch bilateral L2-S1 distributions Provocative maneuvers: + bilateral straight leg raise   Bilateral hips- Palpation: No tenderness to palpation over LEFT greater trochanter or IT band; TTP R GT ROM: No pain with flexion and internal rotation

## 2023-12-28 NOTE — HISTORY OF PRESENT ILLNESS
[5] : 5 [8] : 8 [Burning] : burning [Sharp] : sharp [Stabbing] : stabbing [Intermittent] : intermittent [Nothing helps with pain getting better] : Nothing helps with pain getting better [Lying in bed] : lying in bed [de-identified] : 6/1/23- JESSIE 52 year old M here for Lower back and RT hip, onset pain since 4/2023, denies and injury , pt was driving from [place] and felt a burning sensation radiating down the RLE posterior thigh. Intermittent pain down the BLE to the legs posteriorly and to the feet.   7/27/23-Continued LBP with radiation down RLE posteriorly to toes 4-5. Aggravated by turning over in bed and walking. In PT. Denies b/b incontinence. Reports anxiety/nightmares with doc; has D/Leroy.   12/28/23- Continued lower back pain and stiffness. has been attending PT.  [] : no [FreeTextEntry5] : 12/28/2023: Pt is here today to follow up on lower back pain,  [de-identified] : certain movement

## 2023-12-28 NOTE — ASSESSMENT
[FreeTextEntry1] : Severe DDD L4-S1 with element of congenital stenosis and BLE radic  Patient has failed 6 weeks of conservative care, including physical therapy and medications. Will obtain lumbar MRI to rule out HNP; will also be used to guide potential future injections/surgical management. . f/u after MRI  NSAIDs- Patient warned of risk of medication to GI tract, increased blood pressure, cardiac risk, and risk of fluid retention. Advised to clear medication with internist or PCP if any concurrent health problem with heart, blood pressure, or GI system exists.  Entered by Soha Dickson acting as scribe. Dr. Hoskins- The documentation recorded by the scribe accurately reflects the service I personally performed and the decisions made by me.

## 2024-01-05 ENCOUNTER — APPOINTMENT (OUTPATIENT)
Dept: MRI IMAGING | Facility: CLINIC | Age: 54
End: 2024-01-05

## 2024-01-18 ENCOUNTER — APPOINTMENT (OUTPATIENT)
Dept: ORTHOPEDIC SURGERY | Facility: CLINIC | Age: 54
End: 2024-01-18

## 2024-04-01 ENCOUNTER — APPOINTMENT (OUTPATIENT)
Dept: ORTHOPEDIC SURGERY | Facility: CLINIC | Age: 54
End: 2024-04-01
Payer: MEDICAID

## 2024-04-01 VITALS — BODY MASS INDEX: 29.53 KG/M2 | WEIGHT: 218 LBS | HEIGHT: 72 IN

## 2024-04-01 DIAGNOSIS — M17.11 UNILATERAL PRIMARY OSTEOARTHRITIS, RIGHT KNEE: ICD-10-CM

## 2024-04-01 PROCEDURE — 20610 DRAIN/INJ JOINT/BURSA W/O US: CPT | Mod: RT

## 2024-04-01 PROCEDURE — 73564 X-RAY EXAM KNEE 4 OR MORE: CPT | Mod: RT

## 2024-04-01 PROCEDURE — 99215 OFFICE O/P EST HI 40 MIN: CPT | Mod: 25

## 2024-04-01 RX ORDER — MELOXICAM 15 MG/1
15 TABLET ORAL
Qty: 30 | Refills: 3 | Status: ACTIVE | COMMUNITY
Start: 2024-04-01 | End: 1900-01-01

## 2024-04-01 NOTE — HISTORY OF PRESENT ILLNESS
[Gradual] : gradual [8] : 8 [Dull/Aching] : dull/aching [3] : 3 [de-identified] : 03/08/2023 Mr. JESSIE BINGHAM is a 52 years old  M who   presents today for evaluation of 01/18/2023: 52 year male is here today as a new patient for chronic R knee Pt denies acute trauma, no n/t, no n/t radiating to hand on affected side.  Experiences mechanical sxs in his R knee daily Pt not taking NSAIDs, no injections, not doing PT.  No h/o surgeries h/o meniscus tear L knee years ago, pain feels similar to current R knee pain   + meloxicam.  03/15/2023 JESSIE 52 year old M is here for Right Knee, states is here to get CSI, is doing the physical therapy for the Right Knee and it is helping, states sometimes walking is having sharp pain and when lying down is having some aching feeling   06/21/2023: JESSIE is here today to follow up on his RIGHT knee. he states his gait is off which is increasing his knee pain. c/o increased pain when walking and laying in down. Had good relief with CSI. Last CSI was three months prior.  04/01/2024: Here for follow up. Had relief for 3-4 months with CSI. Having severe knee pain.  [] : no

## 2024-04-01 NOTE — IMAGING
[de-identified] : Knee Exam\par  Alignment: Varus  Neutral  Valgus\par  Effusion: None\par  Atrophy: None                                                \par  Stable to Varus/valgus stress\par  Posterior Drawer Test: negative\par  Anterior Drawer Test: Negative\par  Knee Extension/Flexion: 0 / 120\par  \par  Medial/lateral compartments\par  Medial joint line: POS Tenderness\par  Lateral joint line: No Tenderness\par  Marlo test: POS\par  \par  Patellofemoral joint\par  Medial patellar facet: no tenderness\par  Patellar grind: Negative\par  \par  Tendons:\par  Pes Anserine: No tenderness\par  Gerdy's Tubercle/ IT Band: No tenderness\par  Quadriceps Tendon: No Tenderness\par  patellar tendon: no Tenderness\par  Tibial tubercle: not tenderness\par  Calf: no Tenderness\par  \par  Neurovascular exam\par  Muscle strength: 5/5\par  Sensation to light touch: intact\par  Distal pulses: 2+\par  \par  MRI \par  1. Medial meniscal tear.\par  2. Arthrosis with joint effusion.\par  3. Lateral subluxation of the patella. Quadriceps insertional tendinopathy and fraying. Proximal patella \par  tendinopathy.\par  4. Impaction injury of the fibular head with 5 mm cyst and reactive marrow edema.\par  5. Incompletely evaluated enchondroma of fibular head with no fracture along the visualized margins of the \par  enchondroma.\par  6. 5 mm loose body versus focal fat mimicking loose body posterior to the tibiofibular joint. Plain film \par  correlation is suggested.

## 2024-04-01 NOTE — ASSESSMENT
[FreeTextEntry1] : 52 year M with right knee mensical tear and OA - physical therapy, NSAIDs, and meloxicam - Return in 1-2 weeks for CSI of the right knee  03/15/2023 CSI of the right knee today 3 months PRN fu  23: CSI of the right knee. PT renewed for Lspine and knee Follow up 3 months PRN  2024: right knee OA and meniscal tearing with 3-4 months of relief.  starting to feel the right knee is preventing him from doing the things that likes to do. CSI of the right knee 3 macarena fu  Time Based billin minutes was spent with the patient today taking the patient's history, conducting a physical examination, reviewing imaging studies, and  detailed discussion regarding the diagnosis and treatment plan.

## 2024-07-01 ENCOUNTER — APPOINTMENT (OUTPATIENT)
Dept: ORTHOPEDIC SURGERY | Facility: CLINIC | Age: 54
End: 2024-07-01
Payer: MEDICAID

## 2024-07-01 DIAGNOSIS — M23.91 UNSPECIFIED INTERNAL DERANGEMENT OF RIGHT KNEE: ICD-10-CM

## 2024-07-01 PROCEDURE — 99215 OFFICE O/P EST HI 40 MIN: CPT

## 2024-07-01 RX ORDER — QUETIAPINE 150 MG/1
TABLET, EXTENDED RELEASE ORAL
Refills: 0 | Status: ACTIVE | COMMUNITY

## 2024-07-01 RX ORDER — VENLAFAXINE HYDROCHLORIDE 150 MG/1
CAPSULE, EXTENDED RELEASE ORAL
Refills: 0 | Status: ACTIVE | COMMUNITY

## 2024-10-21 ENCOUNTER — APPOINTMENT (OUTPATIENT)
Dept: ORTHOPEDIC SURGERY | Facility: CLINIC | Age: 54
End: 2024-10-21
Payer: MEDICAID

## 2024-10-21 DIAGNOSIS — S66.911A STRAIN OF UNSPECIFIED MUSCLE, FASCIA AND TENDON AT WRIST AND HAND LEVEL, RIGHT HAND, INITIAL ENCOUNTER: ICD-10-CM

## 2024-10-21 PROCEDURE — 99214 OFFICE O/P EST MOD 30 MIN: CPT | Mod: 25

## 2024-10-21 PROCEDURE — 20610 DRAIN/INJ JOINT/BURSA W/O US: CPT | Mod: RT

## 2024-10-21 PROCEDURE — 73110 X-RAY EXAM OF WRIST: CPT | Mod: RT

## 2024-10-28 ENCOUNTER — APPOINTMENT (OUTPATIENT)
Dept: ORTHOPEDIC SURGERY | Facility: CLINIC | Age: 54
End: 2024-10-28
Payer: MEDICAID

## 2024-10-28 DIAGNOSIS — M17.11 UNILATERAL PRIMARY OSTEOARTHRITIS, RIGHT KNEE: ICD-10-CM

## 2024-10-28 PROCEDURE — 99214 OFFICE O/P EST MOD 30 MIN: CPT

## 2024-10-28 RX ORDER — METHYLPREDNISOLONE 4 MG/1
4 TABLET ORAL
Qty: 1 | Refills: 1 | Status: ACTIVE | COMMUNITY
Start: 2024-10-28 | End: 1900-01-01

## 2024-11-18 ENCOUNTER — APPOINTMENT (OUTPATIENT)
Dept: ORTHOPEDIC SURGERY | Facility: CLINIC | Age: 54
End: 2024-11-18
Payer: MEDICAID

## 2024-11-18 DIAGNOSIS — M17.11 UNILATERAL PRIMARY OSTEOARTHRITIS, RIGHT KNEE: ICD-10-CM

## 2024-11-18 PROCEDURE — 99213 OFFICE O/P EST LOW 20 MIN: CPT

## 2024-12-02 ENCOUNTER — APPOINTMENT (OUTPATIENT)
Dept: MRI IMAGING | Facility: CLINIC | Age: 54
End: 2024-12-02

## 2025-01-06 ENCOUNTER — APPOINTMENT (OUTPATIENT)
Dept: ORTHOPEDIC SURGERY | Facility: CLINIC | Age: 55
End: 2025-01-06
Payer: MEDICAID

## 2025-01-06 DIAGNOSIS — M17.11 UNILATERAL PRIMARY OSTEOARTHRITIS, RIGHT KNEE: ICD-10-CM

## 2025-01-06 PROCEDURE — 99214 OFFICE O/P EST MOD 30 MIN: CPT

## 2025-01-13 ENCOUNTER — APPOINTMENT (OUTPATIENT)
Dept: ORTHOPEDIC SURGERY | Facility: CLINIC | Age: 55
End: 2025-01-13